# Patient Record
Sex: FEMALE | Race: WHITE
[De-identification: names, ages, dates, MRNs, and addresses within clinical notes are randomized per-mention and may not be internally consistent; named-entity substitution may affect disease eponyms.]

---

## 2018-09-05 ENCOUNTER — HOSPITAL ENCOUNTER (EMERGENCY)
Age: 58
Discharge: HOME | End: 2018-09-05
Payer: COMMERCIAL

## 2018-09-05 VITALS
SYSTOLIC BLOOD PRESSURE: 111 MMHG | OXYGEN SATURATION: 100 % | DIASTOLIC BLOOD PRESSURE: 70 MMHG | HEART RATE: 66 BPM | RESPIRATION RATE: 20 BRPM

## 2018-09-05 VITALS
OXYGEN SATURATION: 100 % | DIASTOLIC BLOOD PRESSURE: 70 MMHG | SYSTOLIC BLOOD PRESSURE: 111 MMHG | HEART RATE: 66 BPM | RESPIRATION RATE: 20 BRPM

## 2018-09-05 VITALS
SYSTOLIC BLOOD PRESSURE: 137 MMHG | RESPIRATION RATE: 18 BRPM | HEART RATE: 77 BPM | DIASTOLIC BLOOD PRESSURE: 88 MMHG | OXYGEN SATURATION: 99 %

## 2018-09-05 DIAGNOSIS — J06.9: Primary | ICD-10-CM

## 2018-09-05 PROCEDURE — 71046 X-RAY EXAM CHEST 2 VIEWS: CPT

## 2018-09-05 PROCEDURE — 99282 EMERGENCY DEPT VISIT SF MDM: CPT

## 2018-09-05 PROCEDURE — 99283 EMERGENCY DEPT VISIT LOW MDM: CPT

## 2018-09-05 NOTE — ED.URI
"HPI - URI/Sore Throat
General
Chief Complaint: Upper Respiratory Symptoms
Stated Complaint: COUGHING, CANT SLEEP WHEEZING SEVERAL DAYS
Time Seen by Provider: 09/05/18 06:50
Source: patient
Mode of arrival: ambulatory
Limitations: no limitations
History of Present Illness
HPI Narrative:   Patient has had URI type symptoms, with runny nose and cough, and also has been wheezing.  She states she has been on low-dose prednisone,  and intermittently uses an inhaler.
MD Complaint: cough
Onset (ago): day(s) (  Three)
Duration: intermittent
Severity: mild
Relieving factors: nothing
Exacerbating factors: other ( coughing)
Description of mucous: other ( none)
Able to tolerate fluids by mouth: Yes
Context: other ( none)
Associated symptoms: denies other symptoms
Related Data
Home Medications

 Medication  Instructions  Recorded  Confirmed
[PROBIOTIC] PO QDAY #0 04/03/18 
[VITAMIN B] PO QDAY #0 04/03/18 
[VITAMIN D] PO QDAY #0 04/03/18 
ascorbic acid (vitamin C) PO QDAY #0 04/03/18 
estradiol 1 patch TD SEE INSTRUCTIONS #0 04/03/18 
multivitamin [Multiple Vitamins] #0 04/03/18 
omeprazole PO #0 04/03/18 
progesterone micronized [Crinone] VAGINAL HS #0 04/03/18 
sumatriptan succinate 100 mg PO PRN PRN #0 04/03/18 

Previous Rx's

 Medication  Instructions  Recorded
beclomethasone dipropionate [Qvar] 2 puff INH BID #1 inh 04/03/18
prednisone 20 mg PO Q DAY #24 tab 04/03/18
acetaminophen-codeine 1 tab PO Q6H PRN #10 tab 09/05/18
[Tylenol-Codeine #3]  
prednisone 60 mg PO DAILY #9 tab 09/05/18


Allergies

Allergy/AdvReac Type Severity Reaction Status Date / Time
amoxicillin [AMOXICILLIN] AdvReac Unknown  Unverified 04/11/18 12:52
erythromycin base AdvReac Unknown  Unverified 04/11/18 12:52
[From ERYTHROCIN]     
Sulfa (Sulfonamide AdvReac Unknown  Unverified 04/11/18 12:52
Antibiotics)     
[SULFA (SULFONAMIDE     
ANTIBIOTICS)]     



Review of Systems
Review of Systems
All systems reviewed & are unremarkable except as noted in HPI and below 
Constitutional
Denies chills, Denies fever(s), Denies lethargy and Denies weakness
Eyes
Denies change in vision, Denies eye discharge, Denies irritation and Denies loss of vision
ENT
Ears, Nose, Mouth, and Throat: Denies change in voice, Denies neck pain, Denies sore throat and Denies throat swelling
Cardiovascular
Denies chest pain, Denies irregular heart rhythm, Denies lightheadedness, Denies palpitations and Denies orthopnea
Respiratory
Reports as per HPI, Reports cough and Reports wheezing
Gastrointestinal
Gastrointestinal: Denies abdominal pain, Denies change in bowel habits, Denies diarrhea, Denies nausea and Denies vomiting
Genitourinary
Denies hematuria, Denies flank pain, Denies urinary incontinence and Denies urinary urgency
Musculoskeletal
Denies neck pain
Integumentary/Breasts
Denies pruritus, Denies erythema, Denies rash and Denies wounds
Neurologic
Denies confusion, Denies loss of vision and Denies weakness
Psychiatric
Denies anxiety, Denies confusion, Denies depression, Denies homicidal ideation and Denies suicidal ideation
Endocrine
Denies palpitations
Hematologic/Lymphatic
Denies easy bruising
Allergic/Immunologic
Denies urticaria, Denies throat swelling and Reports wheezing

PFSH
Medical History

Upper respiratory infection (Acute)
Reactive airway disease with acute exacerbation (Acute)
Bronchitis (Acute)


Surgical History

No pertinent past surgical history (Acute)


Social History
Smoking Status:  Never smoker




Exam
Initial Vital Signs
Initial Vital Signs:  Vital Signs

Pulse Rate  66   09/05/18 06:40
Respiratory Rate  20   09/05/18 06:40
Blood Pressure  111/70   09/05/18 06:40
Pulse Oximetry  100   09/05/18 06:40


Const
General: cooperative and well developed
Nutritional Appearance: well nourished
Orientation: alert, awake, oriented x3 and not confused
[facility]
Head: normocephalic and atraumatic
Ears: external ears normal and TM's normal bilaterally
Nose: 
[id number]|2018-09-12 00:00:00|2018-09-12 11:57:00|.S_ITS|[facility]|Imaging|[id number]|"PROCEDURE:  US ABDOMEN COMPLETE

## 2018-09-05 NOTE — DI.RAD.S_ITS
PROCEDURE:  XR CHEST 2V  
   
INDICATIONS:  cough  
   
TECHNIQUE:  2 views of the chest were acquired.    
   
COMPARISON:  [facility], , CHEST 2 VIEW, 4/03/2018, 14:12.  
   
FINDINGS:    
   
Surgical changes and devices:  None.    
   
Lungs and pleura:  No pleural effusions or pneumothorax.  Lungs are clear.    
   
Mediastinum:  Mediastinal contours are normal.  Heart size is normal.    
   
Bones and chest wall:  No suspicious bony abnormalities.  Soft tissues appear   
unremarkable.    
   
IMPRESSION:  No acute cardiopulmonary disease process.  
   
   
   
Dictated by: Celeste Patel MD, PhD on 9/05/2018 at 9:38       
Approved by: Celeste Patel MD, PhD on 9/05/2018 at 9:39

## 2018-10-25 ENCOUNTER — HOSPITAL ENCOUNTER (OUTPATIENT)
Age: 58
End: 2018-10-25
Payer: COMMERCIAL

## 2018-10-25 DIAGNOSIS — H90.3: ICD-10-CM

## 2018-10-25 DIAGNOSIS — G43.909: Primary | ICD-10-CM

## 2018-10-25 DIAGNOSIS — C44.90: ICD-10-CM

## 2018-10-25 DIAGNOSIS — K21.9: ICD-10-CM

## 2018-10-25 LAB
25(OH)D3+25(OH)D2 SERPL-MCNC: 29.5 NG/ML (ref 30–100)
HBA1C MFR BLD: 5.5 % (ref 4–6)

## 2018-10-25 PROCEDURE — 83090 ASSAY OF HOMOCYSTEINE: CPT

## 2018-10-25 PROCEDURE — 82306 VITAMIN D 25 HYDROXY: CPT

## 2018-10-25 PROCEDURE — 83036 HEMOGLOBIN GLYCOSYLATED A1C: CPT

## 2018-10-25 PROCEDURE — 36415 COLL VENOUS BLD VENIPUNCTURE: CPT

## 2020-06-17 ENCOUNTER — HOSPITAL ENCOUNTER (EMERGENCY)
Age: 60
Discharge: HOME | End: 2020-06-17
Payer: COMMERCIAL

## 2020-06-17 VITALS
HEART RATE: 56 BPM | OXYGEN SATURATION: 100 % | TEMPERATURE: 98.5 F | DIASTOLIC BLOOD PRESSURE: 79 MMHG | SYSTOLIC BLOOD PRESSURE: 170 MMHG | RESPIRATION RATE: 14 BRPM

## 2020-06-17 VITALS
DIASTOLIC BLOOD PRESSURE: 67 MMHG | HEART RATE: 63 BPM | OXYGEN SATURATION: 98 % | SYSTOLIC BLOOD PRESSURE: 148 MMHG | RESPIRATION RATE: 15 BRPM

## 2020-06-17 VITALS
DIASTOLIC BLOOD PRESSURE: 62 MMHG | RESPIRATION RATE: 17 BRPM | OXYGEN SATURATION: 99 % | SYSTOLIC BLOOD PRESSURE: 133 MMHG | HEART RATE: 52 BPM

## 2020-06-17 VITALS — BODY MASS INDEX: 27.9 KG/M2

## 2020-06-17 DIAGNOSIS — R07.89: Primary | ICD-10-CM

## 2020-06-17 LAB
ADD MANUAL DIFF / SLIDE REVIEW: NO
ALBUMIN SERPL-MCNC: 4.3 G/DL (ref 3.5–5)
ALBUMIN/GLOB SERPL: 1.7 {RATIO} (ref 1–2.8)
ALP SERPL-CCNC: 68 U/L (ref 38–126)
ALT SERPL-CCNC: 15 IU/L (ref ?–35)
BUN SERPL-MCNC: 16 MG/DL (ref 7–17)
CALCIUM SERPL-MCNC: 9.9 MG/DL (ref 8.4–10.2)
CHLORIDE SERPL-SCNC: 106 MMOL/L (ref 98–107)
CK SERPL-CCNC: 67 U/L (ref 30–135)
CKMB % RELATIVE INDEX: (no result) % (ref 1.5–5)
CO2 SERPL-SCNC: 26 MMOL/L (ref 22–32)
ESTIMATED GLOMERULAR FILT RATE: > 60 ML/MIN (ref 60–?)
GLOBULIN SER CALC-MCNC: 2.6 G/DL (ref 1.7–4.1)
GLUCOSE SERPL-MCNC: 97 MG/DL (ref 80–110)
HEMATOCRIT: 38.4 % (ref 36–46)
HEMOGLOBIN: 12.9 G/DL (ref 12–16)
HEMOLYSIS: < 15 (ref 0–50)
LIPASE SERPL-CCNC: 85 U/L (ref 23–300)
LYMPHOCYTES # SPEC AUTO: 1700 /UL (ref 1100–4500)
MCV RBC: 82 FL (ref 80–100)
MEAN CORPUSCULAR HEMOGLOBIN: 27.6 PG (ref 26–34)
MEAN CORPUSCULAR HGB CONC: 33.7 % (ref 30–36)
PLATELET COUNT: 318 X10^3/UL (ref 150–400)
POTASSIUM SERPL-SCNC: 3.9 MMOL/L (ref 3.4–5.1)
PROT SERPL-MCNC: 6.9 G/DL (ref 6.3–8.2)
SODIUM SERPL-SCNC: 137 MMOL/L (ref 137–145)
TROPONIN I SERPL-MCNC: < 0.012 NG/ML (ref 0.01–0.03)

## 2020-06-17 PROCEDURE — 82550 ASSAY OF CK (CPK): CPT

## 2020-06-17 PROCEDURE — 84484 ASSAY OF TROPONIN QUANT: CPT

## 2020-06-17 PROCEDURE — 99284 EMERGENCY DEPT VISIT MOD MDM: CPT

## 2020-06-17 PROCEDURE — 85379 FIBRIN DEGRADATION QUANT: CPT

## 2020-06-17 PROCEDURE — 85025 COMPLETE CBC W/AUTO DIFF WBC: CPT

## 2020-06-17 PROCEDURE — 36415 COLL VENOUS BLD VENIPUNCTURE: CPT

## 2020-06-17 PROCEDURE — 80053 COMPREHEN METABOLIC PANEL: CPT

## 2020-06-17 PROCEDURE — 83690 ASSAY OF LIPASE: CPT

## 2020-06-17 PROCEDURE — 96374 THER/PROPH/DIAG INJ IV PUSH: CPT

## 2020-06-17 PROCEDURE — 71045 X-RAY EXAM CHEST 1 VIEW: CPT

## 2020-06-17 PROCEDURE — 93005 ELECTROCARDIOGRAM TRACING: CPT

## 2020-06-17 PROCEDURE — 96361 HYDRATE IV INFUSION ADD-ON: CPT

## 2020-06-17 NOTE — DI.RAD.S_ITS
PROCEDURE:  XR CHEST 1V  
   
INDICATIONS:  Chest Pain  
   
TECHNIQUE:  One view of the chest was acquired.    
   
COMPARISON:  None.  
   
FINDINGS:    
   
Surgical changes and devices:  None.    
   
Lungs and pleura:  Lungs are clear.  No pleural effusions or pneumothorax.    
   
Mediastinum:  Mediastinal contours appear normal.  Heart size is normal.    
   
Bones and chest wall:  No suspicious bony lesions.  Overlying soft tissues appear   
unremarkable.    
   
IMPRESSION:  No acute cardiopulmonary disease process.  
   
   
   
Dictated by: Celeste Patel MD, PhD on 6/18/2020 at 8:57       
Approved by: Celeste Patel MD, PhD on 6/18/2020 at 9:00

## 2020-06-17 NOTE — ED.CHESTPAIN
"HPI - Chest Pain
General
Chief Complaint: Chest Pain
Stated Complaint: severe chest pain
Time Seen by Provider: 06/17/20 21:41
Source: patient
Mode of arrival: Ambulatory
Limitations: no limitations
History of Present Illness
HPI narrative: 59-year-old female former smoker with history of migraines, GERD and an abdominal hernia presents with a chief complaint of sharp and stabbing anterior chest pain over the course of the day.  She does not remember any specific injury 
but states she has had some sneezing as she has seasonal allergies.  She does not recall any specific event or injury and denies any overuse.  She has had no cough, hemoptysis, shortness of breath.  She denies cardiac equivalent such as dizziness, 
weakness or lightheadedness.  She has had no fever or chills.  She denies nausea, vomiting or abdominal pain.  She did have 1 episode of diarrhea earlier today.  Her pain is very sharp and stabbing, reproducible with deep breath, motion or palpation.
MD complaint: chest pain
Onset (ago): hour(s)
Duration: constant
Pain location: substernal
Severity: moderate
Quality: sharp
Pain radiation: none
Relieving factors: remaining still
Exacerbating factors: inspiration, palpation and movement
Treatments prior to arrival chest pain: none
Related Data
On Oral Contraceptives: No
Home Medications

 Medication  Instructions  Recorded  Confirmed
[PROBIOTIC] PO QDAY #0 04/03/18 
[VITAMIN B] PO QDAY #0 04/03/18 
[VITAMIN D] PO QDAY #0 04/03/18 
ascorbic acid (vitamin C) PO QDAY #0 04/03/18 
estradiol 1 patch TD SEE INSTRUCTIONS #0 04/03/18 
multivitamin [Multiple Vitamins] #0 04/03/18 
omeprazole PO #0 04/03/18 
progesterone micronized [Crinone] VAGINAL HS #0 04/03/18 
sumatriptan succinate 100 mg PO PRN PRN #0 04/03/18 

Previous Rx's

 Medication  Instructions  Recorded
beclomethasone dipropionate [Qvar] 2 puff INH BID #1 inh 04/03/18
prednisone 20 mg PO Q DAY #24 tab 04/03/18
acetaminophen-codeine 1 tab PO Q6H PRN #10 tab 09/05/18
[Tylenol-Codeine #3]  
prednisone 60 mg PO DAILY #9 tab 09/05/18
cyclobenzaprine 10 mg PO TID PRN #14 tab 06/17/20
ketorolac 10 mg PO Q6H PRN #14 tab 06/17/20
lidocaine [Lidoderm] 1 patch TOP DAILY #15 each 06/17/20


Allergies

Allergy/AdvReac Type Severity Reaction Status Date / Time
amoxicillin [AMOXICILLIN] AdvReac Unknown  Unverified 04/11/18 12:52
erythromycin base AdvReac Unknown  Unverified 04/11/18 12:52
[From ERYTHROCIN]     
Sulfa (Sulfonamide AdvReac Unknown  Unverified 04/11/18 12:52
Antibiotics)     
[SULFA (SULFONAMIDE     
ANTIBIOTICS)]     



Review of Systems
Constitutional
Constitutional: Denies chills, Denies fatigue, Denies fever(s), Denies frequent falls, Denies lethargy and Denies weakness
Eyes
Eyes: Denies change in vision, Denies eye discharge, Denies irritation and Denies loss of vision
ENT
Ears, Nose, Mouth, and Throat: Denies change in voice, Denies dizziness, Denies neck pain, Denies sore throat and Denies throat swelling
Cardiovascular
Cardiovascular: Reports chest pain, Denies irregular heart rhythm, Denies lightheadedness, Denies palpitations, Denies dyspnea, Denies dyspnea on exertion and Denies orthopnea
Respiratory
Respiratory: Denies cough, Denies dyspnea, Denies dyspnea on exertion and Denies wheezing
Gastrointestinal
Gastrointestinal: Denies abdominal pain, Denies change in bowel habits, Denies diarrhea, Denies nausea and Denies vomiting
Musculoskeletal
Musculoskeletal: Denies neck pain and Denies numbness
Integumentary/Breasts
Skin/Breast: Denies pruritus, Denies erythema, Denies rash and Denies wounds
Neurologic
Neurologic: Denies behavioral changes, Denies confusion, Denies dizziness, Denies frequent falls, Denies loss of vision, Denies numbness and Denies weakness
Psychiatric
Psychiatric: Denies anxiety, Denies behavioral changes, Denies confusion, Denies depression, Denies homicidal ideation and Denies suicidal ideation
Endocrine
Endocrine: Denies fatigue, Denies flushing an
[id number]|2020-06-18 09:24:00|2020-06-18 13:42:00|DI.RAD.S_ITS|[facility]|Imaging|[id number]|"PROCEDURE:  XR LUMBAR SPINE MIN 4V

## 2020-06-25 ENCOUNTER — HOSPITAL ENCOUNTER (EMERGENCY)
Age: 60
Discharge: HOME | End: 2020-06-25
Payer: COMMERCIAL

## 2020-06-25 VITALS
DIASTOLIC BLOOD PRESSURE: 72 MMHG | HEART RATE: 55 BPM | RESPIRATION RATE: 16 BRPM | TEMPERATURE: 97.16 F | SYSTOLIC BLOOD PRESSURE: 145 MMHG | OXYGEN SATURATION: 98 %

## 2020-06-25 VITALS
HEART RATE: 63 BPM | OXYGEN SATURATION: 98 % | SYSTOLIC BLOOD PRESSURE: 127 MMHG | DIASTOLIC BLOOD PRESSURE: 63 MMHG | RESPIRATION RATE: 16 BRPM

## 2020-06-25 VITALS
SYSTOLIC BLOOD PRESSURE: 126 MMHG | OXYGEN SATURATION: 96 % | RESPIRATION RATE: 16 BRPM | DIASTOLIC BLOOD PRESSURE: 63 MMHG | HEART RATE: 59 BPM

## 2020-06-25 VITALS
SYSTOLIC BLOOD PRESSURE: 126 MMHG | OXYGEN SATURATION: 96 % | HEART RATE: 64 BPM | RESPIRATION RATE: 20 BRPM | DIASTOLIC BLOOD PRESSURE: 63 MMHG

## 2020-06-25 VITALS — BODY MASS INDEX: 27.9 KG/M2

## 2020-06-25 VITALS
OXYGEN SATURATION: 94 % | RESPIRATION RATE: 23 BRPM | SYSTOLIC BLOOD PRESSURE: 114 MMHG | HEART RATE: 67 BPM | DIASTOLIC BLOOD PRESSURE: 59 MMHG

## 2020-06-25 VITALS
DIASTOLIC BLOOD PRESSURE: 72 MMHG | SYSTOLIC BLOOD PRESSURE: 148 MMHG | OXYGEN SATURATION: 96 % | HEART RATE: 71 BPM | RESPIRATION RATE: 20 BRPM

## 2020-06-25 VITALS
HEART RATE: 64 BPM | OXYGEN SATURATION: 98 % | SYSTOLIC BLOOD PRESSURE: 140 MMHG | DIASTOLIC BLOOD PRESSURE: 69 MMHG | RESPIRATION RATE: 17 BRPM

## 2020-06-25 DIAGNOSIS — R00.1: ICD-10-CM

## 2020-06-25 DIAGNOSIS — R07.9: Primary | ICD-10-CM

## 2020-06-25 DIAGNOSIS — M25.511: ICD-10-CM

## 2020-06-25 DIAGNOSIS — R33.8: ICD-10-CM

## 2020-06-25 LAB
ADD MANUAL DIFF / SLIDE REVIEW: NO
ALBUMIN SERPL-MCNC: 4.5 G/DL (ref 3.5–5)
ALBUMIN/GLOB SERPL: 1.6 {RATIO} (ref 1–2.8)
ALP SERPL-CCNC: 64 U/L (ref 38–126)
ALT SERPL-CCNC: 13 IU/L (ref ?–35)
APPEARANCE UR: CLEAR
BILIRUBIN URINE UA: NEGATIVE
BUN SERPL-MCNC: 15 MG/DL (ref 7–17)
CALCIUM SERPL-MCNC: 9.7 MG/DL (ref 8.4–10.2)
CHLORIDE SERPL-SCNC: 103 MMOL/L (ref 98–107)
CK SERPL-CCNC: 84 U/L (ref 30–135)
CKMB % RELATIVE INDEX: (no result) % (ref 1.5–5)
CO2 SERPL-SCNC: 21 MMOL/L (ref 22–32)
COLOR UR: YELLOW
ESTIMATED GLOMERULAR FILT RATE: > 60 ML/MIN (ref 60–?)
GLOBULIN SER CALC-MCNC: 2.8 G/DL (ref 1.7–4.1)
GLUCOSE SERPL-MCNC: 141 MG/DL (ref 80–110)
GLUCOSE URINE UA: NEGATIVE G/DL
HEMATOCRIT: 40 % (ref 36–46)
HEMOGLOBIN: 13.4 G/DL (ref 12–16)
HEMOLYSIS: < 15 (ref 0–50)
HGB UR QL: (no result)
KETONES URINE UA: NEGATIVE
LEUKOCYTE ESTERASE URINE UA: NEGATIVE
LIPASE SERPL-CCNC: 66 U/L (ref 23–300)
LYMPHOCYTES # SPEC AUTO: 300 /UL (ref 1100–4500)
MCV RBC: 82.8 FL (ref 80–100)
MEAN CORPUSCULAR HEMOGLOBIN: 27.8 PG (ref 26–34)
MEAN CORPUSCULAR HGB CONC: 33.6 % (ref 30–36)
NITRITE URINE UA: NEGATIVE
PH UR: 7 [PH] (ref 4.5–8)
PLATELET COUNT: 313 X10^3/UL (ref 150–400)
POTASSIUM SERPL-SCNC: 4.1 MMOL/L (ref 3.4–5.1)
PROT SERPL-MCNC: 7.3 G/DL (ref 6.3–8.2)
PROTEIN URINE UA: NEGATIVE
SODIUM SERPL-SCNC: 133 MMOL/L (ref 137–145)
SP GR UR: 1.01 (ref 1–1.03)
TROPONIN I SERPL-MCNC: < 0.012 NG/ML (ref 0.01–0.03)
UROBILINOGEN UR QL: 0.2 E.U./DL

## 2020-06-25 PROCEDURE — 36415 COLL VENOUS BLD VENIPUNCTURE: CPT

## 2020-06-25 PROCEDURE — 84484 ASSAY OF TROPONIN QUANT: CPT

## 2020-06-25 PROCEDURE — 93005 ELECTROCARDIOGRAM TRACING: CPT

## 2020-06-25 PROCEDURE — 51798 US URINE CAPACITY MEASURE: CPT

## 2020-06-25 PROCEDURE — 96374 THER/PROPH/DIAG INJ IV PUSH: CPT

## 2020-06-25 PROCEDURE — 81001 URINALYSIS AUTO W/SCOPE: CPT

## 2020-06-25 PROCEDURE — 85025 COMPLETE CBC W/AUTO DIFF WBC: CPT

## 2020-06-25 PROCEDURE — 80053 COMPREHEN METABOLIC PANEL: CPT

## 2020-06-25 PROCEDURE — 96376 TX/PRO/DX INJ SAME DRUG ADON: CPT

## 2020-06-25 PROCEDURE — 51701 INSERT BLADDER CATHETER: CPT

## 2020-06-25 PROCEDURE — 99284 EMERGENCY DEPT VISIT MOD MDM: CPT

## 2020-06-25 PROCEDURE — 99285 EMERGENCY DEPT VISIT HI MDM: CPT

## 2020-06-25 PROCEDURE — 82550 ASSAY OF CK (CPK): CPT

## 2020-06-25 PROCEDURE — 83690 ASSAY OF LIPASE: CPT

## 2020-06-25 NOTE — ED.CHESTPAIN
"HPI - Chest Pain
General
Chief Complaint: Chest Pain
Stated Complaint: chest pain, sob, covid neg, post surg
Time Seen by Provider: 06/25/20 19:34
Source: patient
Mode of arrival: Wheelchair
Limitations: no limitations
History of Present Illness
HPI narrative: 59-year-old female underwent a robotic laparoscopic abdominal hernia repair than the past 24 hours.  Was discharged from the hospital.  Prior to her discharge she was complaining of right shoulder pain and right-sided chest pain.  She 
was seen here in the emergency department a couple weeks ago for the same right-sided chest pain that she arrives with today.  She was told prior to discharge that the shoulder pain was secondary to the gas that was placed in her abdomen for the 
surgery.  She also states that she did urinate prior to discharging from the hospital but since then has had problems urinating.  Has had some hesitancy.  She did take pain medication prior to arrival.  Reports that the right-sided chest pain is 
worse with palpation and movement of her right shoulder same thing with her right shoulder pain.  No fevers.
Related Data
Home Medications

 Medication  Instructions  Recorded  Confirmed
[PROBIOTIC] PO QDAY #0 04/03/18 
[VITAMIN B] PO QDAY #0 04/03/18 
[VITAMIN D] PO QDAY #0 04/03/18 
ascorbic acid (vitamin C) PO QDAY #0 04/03/18 
estradiol 1 patch TD SEE INSTRUCTIONS #0 04/03/18 
multivitamin [Multiple Vitamins] #0 04/03/18 
omeprazole PO #0 04/03/18 
progesterone micronized [Crinone] VAGINAL HS #0 04/03/18 
sumatriptan succinate 100 mg PO PRN PRN #0 04/03/18 

Previous Rx's

 Medication  Instructions  Recorded
beclomethasone dipropionate [Qvar] 2 puff INH BID #1 inh 04/03/18
prednisone 20 mg PO Q DAY #24 tab 04/03/18
acetaminophen-codeine 1 tab PO Q6H PRN #10 tab 09/05/18
[Tylenol-Codeine #3]  
prednisone 60 mg PO DAILY #9 tab 09/05/18
cyclobenzaprine 10 mg PO TID PRN #14 tab 06/17/20
ketorolac 10 mg PO Q6H PRN #14 tab 06/17/20
lidocaine [Lidoderm] 1 patch TOP DAILY #15 each 06/17/20


Allergies

Allergy/AdvReac Type Severity Reaction Status Date / Time
amoxicillin [AMOXICILLIN] AdvReac Unknown  Verified 06/25/20 20:05
erythromycin base AdvReac Unknown  Verified 06/25/20 20:05
[From ERYTHROCIN]     
Sulfa (Sulfonamide AdvReac Unknown  Verified 06/25/20 20:05
Antibiotics)     
[SULFA (SULFONAMIDE     
ANTIBIOTICS)]     



Review of Systems
Constitutional
Constitutional: Denies fever(s) and Denies headache(s)
ENT
Ears, Nose, Mouth, and Throat: Denies headache(s)
Cardiovascular
Cardiovascular: Reports chest pain and Denies dyspnea
Respiratory
Respiratory: Denies cough and Denies dyspnea
Gastrointestinal
Gastrointestinal: Reports abdominal pain
Genitourinary
Genitourinary: Reports urinary hesitancy
Genitourinary: Reports urinary hesitancy
Musculoskeletal
Musculoskeletal: Reports arthralgias (Shoulder pain)
Integumentary/Breasts
Skin/Breast: Denies rash
Neurologic
Neurologic: Denies behavioral changes and Denies headache(s)
Psychiatric
Psychiatric: Denies behavioral changes
Hematologic/Lymphatic
Hematologic/Lymphatic: Denies easy bleeding and Denies easy bruising

Patient History
Medical History (Reviewed 06/26/20 @ 03:11 by Bernard Perrin DO)

Bronchitis (Acute)
Reactive airway disease with acute exacerbation (Acute)
Upper respiratory infection (Inactive)


Surgical History (Reviewed 06/17/20 @ 22:27 by Hieu Mora DO)

No pertinent past surgical history (Acute)


Social History (Reviewed 06/26/20 @ 03:11 by Bernard Perrin DO)
Smoking Status:  Former smoker 


Smoking Status: Former smoker
Substance Use Type: does not use

Exam
Initial Vital Signs
Initial Vital Signs: Vital Signs

Temperature  97.2 F L  06/25/20 19:33
Pulse Rate  55 L  06/25/20 19:33
Respiratory Rate  16   06/25/20 19:33
Blood Pressure  145/72 H  06/25/20 19:33
Pulse Oximetry  98   06/25/20 19:33


Const
General: cooperative
Limitations: mental status not altered
[facility]
[id number]|2020-06-25 19:43:00|2020-06-25 19:43:00|ED_ITS|[facility]|Emergency Department|[id number]|"HPI - Chest Pain

## 2021-07-17 ENCOUNTER — HOSPITAL ENCOUNTER (EMERGENCY)
Age: 61
Discharge: HOME | End: 2021-07-17
Payer: COMMERCIAL

## 2021-07-17 VITALS
OXYGEN SATURATION: 99 % | RESPIRATION RATE: 18 BRPM | TEMPERATURE: 97.88 F | DIASTOLIC BLOOD PRESSURE: 67 MMHG | SYSTOLIC BLOOD PRESSURE: 137 MMHG | HEART RATE: 64 BPM

## 2021-07-17 DIAGNOSIS — M77.8: ICD-10-CM

## 2021-07-17 DIAGNOSIS — M25.532: Primary | ICD-10-CM

## 2021-07-17 PROCEDURE — 73110 X-RAY EXAM OF WRIST: CPT

## 2021-07-17 PROCEDURE — 99283 EMERGENCY DEPT VISIT LOW MDM: CPT

## 2023-07-12 ENCOUNTER — HOSPITAL ENCOUNTER (EMERGENCY)
Age: 63
Discharge: HOME | End: 2023-07-12
Payer: COMMERCIAL

## 2023-07-12 VITALS
TEMPERATURE: 97.9 F | RESPIRATION RATE: 18 BRPM | OXYGEN SATURATION: 98 % | SYSTOLIC BLOOD PRESSURE: 121 MMHG | DIASTOLIC BLOOD PRESSURE: 65 MMHG | HEART RATE: 72 BPM

## 2023-07-12 VITALS — OXYGEN SATURATION: 98 % | HEART RATE: 70 BPM | RESPIRATION RATE: 18 BRPM

## 2023-07-12 VITALS — BODY MASS INDEX: 27.1 KG/M2

## 2023-07-12 DIAGNOSIS — L30.9: Primary | ICD-10-CM

## 2023-07-12 PROCEDURE — 99282 EMERGENCY DEPT VISIT SF MDM: CPT

## 2023-07-12 PROCEDURE — 99281 EMR DPT VST MAYX REQ PHY/QHP: CPT
